# Patient Record
Sex: FEMALE | Race: BLACK OR AFRICAN AMERICAN | NOT HISPANIC OR LATINO | Employment: PART TIME | RURAL
[De-identification: names, ages, dates, MRNs, and addresses within clinical notes are randomized per-mention and may not be internally consistent; named-entity substitution may affect disease eponyms.]

---

## 2024-03-11 ENCOUNTER — OFFICE VISIT (OUTPATIENT)
Dept: CARDIOLOGY | Facility: CLINIC | Age: 44
End: 2024-03-11
Payer: MEDICAID

## 2024-03-11 VITALS
HEIGHT: 63 IN | SYSTOLIC BLOOD PRESSURE: 142 MMHG | HEART RATE: 83 BPM | BODY MASS INDEX: 42.81 KG/M2 | WEIGHT: 241.6 LBS | DIASTOLIC BLOOD PRESSURE: 80 MMHG

## 2024-03-11 DIAGNOSIS — R06.02 SHORTNESS OF BREATH: ICD-10-CM

## 2024-03-11 DIAGNOSIS — I50.32 DIASTOLIC CHF, CHRONIC: ICD-10-CM

## 2024-03-11 DIAGNOSIS — I10 HYPERTENSION, ESSENTIAL: ICD-10-CM

## 2024-03-11 DIAGNOSIS — R07.9 CHEST PAIN, UNSPECIFIED TYPE: Primary | ICD-10-CM

## 2024-03-11 PROCEDURE — 99204 OFFICE O/P NEW MOD 45 MIN: CPT | Performed by: SPECIALIST

## 2024-03-11 RX ORDER — PANTOPRAZOLE SODIUM 40 MG/1
40 TABLET, DELAYED RELEASE ORAL DAILY
COMMUNITY
Start: 2024-02-29

## 2024-03-11 RX ORDER — VALACYCLOVIR HYDROCHLORIDE 1 G/1
1000 TABLET, FILM COATED ORAL DAILY
COMMUNITY
Start: 2024-02-29 | End: 2024-03-11

## 2024-03-11 RX ORDER — BUSPIRONE HYDROCHLORIDE 10 MG/1
10 TABLET ORAL 3 TIMES DAILY
COMMUNITY
Start: 2024-02-29

## 2024-03-11 RX ORDER — LANOLIN ALCOHOL/MO/W.PET/CERES
400 CREAM (GRAM) TOPICAL DAILY
COMMUNITY
Start: 2024-02-29 | End: 2024-03-11

## 2024-03-11 RX ORDER — METOCLOPRAMIDE 10 MG/1
10 TABLET ORAL 4 TIMES DAILY
COMMUNITY
Start: 2024-02-10 | End: 2024-03-11

## 2024-03-11 RX ORDER — OMEPRAZOLE 40 MG/1
1 CAPSULE, DELAYED RELEASE ORAL DAILY
COMMUNITY
Start: 2023-11-29 | End: 2024-03-11

## 2024-03-11 RX ORDER — HYDROXYZINE HYDROCHLORIDE 25 MG/1
25 TABLET, FILM COATED ORAL EVERY 4 HOURS PRN
COMMUNITY
Start: 2024-02-01 | End: 2024-03-11

## 2024-03-11 RX ORDER — AMOXICILLIN 875 MG/1
875 TABLET, COATED ORAL 2 TIMES DAILY
COMMUNITY
Start: 2023-12-17

## 2024-03-11 RX ORDER — AMLODIPINE BESYLATE 5 MG/1
5 TABLET ORAL
COMMUNITY
Start: 2024-02-29 | End: 2024-03-11

## 2024-03-11 RX ORDER — PSEUDOEPHEDRINE HCL 30 MG
100 TABLET ORAL DAILY
COMMUNITY
Start: 2024-02-29

## 2024-03-11 RX ORDER — NITROGLYCERIN 0.4 MG/1
0.4 TABLET SUBLINGUAL
COMMUNITY
Start: 2023-12-20

## 2024-03-11 RX ORDER — FUROSEMIDE 20 MG/1
20 TABLET ORAL DAILY
COMMUNITY
Start: 2024-03-07

## 2024-03-11 RX ORDER — DICLOFENAC SODIUM 75 MG/1
75 TABLET, DELAYED RELEASE ORAL 2 TIMES DAILY
COMMUNITY
Start: 2024-02-10 | End: 2024-03-11

## 2024-03-11 RX ORDER — CARVEDILOL 6.25 MG/1
6.25 TABLET ORAL 2 TIMES DAILY WITH MEALS
COMMUNITY

## 2024-03-11 RX ORDER — SUCRALFATE 1 G/1
1 TABLET ORAL 4 TIMES DAILY
COMMUNITY
Start: 2024-03-06 | End: 2024-03-11

## 2024-03-11 RX ORDER — ASPIRIN 81 MG/1
81 TABLET ORAL DAILY
COMMUNITY

## 2024-03-11 RX ORDER — NYSTATIN 100000 [USP'U]/G
100000 POWDER TOPICAL 2 TIMES DAILY
COMMUNITY
Start: 2024-02-29

## 2024-03-11 RX ORDER — POLYETHYLENE GLYCOL 3350 17 G/17G
0.4 POWDER, FOR SOLUTION ORAL DAILY
COMMUNITY
Start: 2024-02-29

## 2024-03-11 RX ORDER — BUPRENORPHINE AND NALOXONE 8; 2 MG/1; MG/1
1 FILM, SOLUBLE BUCCAL; SUBLINGUAL DAILY
COMMUNITY
End: 2024-03-11

## 2024-03-11 RX ORDER — TRAMADOL HYDROCHLORIDE 50 MG/1
50 TABLET ORAL EVERY 12 HOURS PRN
COMMUNITY

## 2024-03-11 RX ORDER — BUDESONIDE AND FORMOTEROL FUMARATE DIHYDRATE 160; 4.5 UG/1; UG/1
2 AEROSOL RESPIRATORY (INHALATION)
COMMUNITY
Start: 2024-03-08 | End: 2024-03-11

## 2024-03-11 RX ORDER — HYDROCODONE BITARTRATE AND ACETAMINOPHEN 5; 325 MG/1; MG/1
1 TABLET ORAL EVERY 6 HOURS PRN
COMMUNITY
Start: 2024-02-16 | End: 2024-03-11

## 2024-03-11 RX ORDER — ALBUTEROL SULFATE 90 UG/1
2 AEROSOL, METERED RESPIRATORY (INHALATION) EVERY 4 HOURS PRN
COMMUNITY
Start: 2024-02-29

## 2024-03-11 NOTE — PROGRESS NOTES
[facility]   Cardiology Consult Note    Patient Name: Waleska Truong  : 1980  Referring Physician: Self Referring  Subjective   Subjective     Reason for Consult/ Chief Complaint:   Chief Complaint   Patient presents with    Chest Pain    Shortness of Breath    Leg Swelling       HPI:  Waleska Truong is a 43 y.o. female with history of shortness of breath on and off for 6 months or so shortness of breath is on exertion.  She also has some nonspecific nonexertional chest pain which lasted a few seconds to few minutes no exertional angina no PND no orthopnea no syncopal or presyncopal episode    Review of Systems:    Constitutional no fever,  no weight loss   Skin no rash   Otolaryngeal no difficulty swallowing   Cardiovascular See HPI   Pulmonary no cough, no sputum production   Gastrointestinal no constipation, no diarrhea   Genitourinary no dysuria, no hematuria   Hematologic no easy bruisability, no abnormal bleeding   Musculoskeletal no muscle pain   Neurologic no dizziness, no falls       Personal History     Past Medical History:  Past Medical History:   Diagnosis Date    Asthma     Hypertension        Family History: History reviewed. No pertinent family history.    Social History:  reports that she has never smoked. She has never been exposed to tobacco smoke. She has never used smokeless tobacco. She reports that she does not drink alcohol and does not use drugs.    Home Medications:  albuterol sulfate HFA, amoxicillin, aspirin, busPIRone, carvedilol, docusate sodium, furosemide, nitroglycerin, nystatin, pantoprazole, polyethylene glycol, and traMADol    Allergies:  No Known Allergies    Objective    Objective     Vitals:   Heart Rate:  [83] 83  BP: (142)/(80) 142/80  Body mass index is 42.8 kg/m².  PHYSICAL EXAM:    General Appearance:   well developed  well nourished  HENT:   oropharynx moist  lips not cyanotic  Neck:  thyroid not enlarged  supple  Respiratory:  no respiratory  distress  normal breath sounds  no rales  Cardiovascular:  no jugular venous distention  regular rhythm  apical impulse normal  S1 normal, S2 normal  no S3, no S4   no murmur  no rub, no thrill  carotid pulses normal; no bruit  pedal pulses normal  lower extremity edema: none    Skin:   warm, dry  Psychiatric:  judgement and insight appropriate  normal mood and affect    RESULTS:           Result Review    Result Review:  I have personally reviewed the available results:  [x]  Laboratory  [x]  EKG/Telemetry   [x]  Cardiology/Vascular   [x] Medications  [x]  Old records      Procedures     Impression/Plan  1.  Precordial atypical chest pain: Stress echocardiogram to evaluate for ischemia.  2.  Shortness of breath: Echocardiogram.  Left ventricular systolic function.  2.  Essential hypertension controlled: Continue carvedilol 6.25 mg twice a day.  4.  Chronic diastolic heart failure: Continue Lasix 20 mg once a day.  Echocardiogram.  Low-salt diet advised.        Electronically signed by Denis Burton MD, 03/11/24, 12:40 PM EDT.

## 2024-03-20 ENCOUNTER — TELEPHONE (OUTPATIENT)
Dept: CARDIOLOGY | Facility: CLINIC | Age: 44
End: 2024-03-20
Payer: MEDICAID

## 2024-03-20 ENCOUNTER — TELEPHONE (OUTPATIENT)
Dept: CARDIOLOGY | Facility: CLINIC | Age: 44
End: 2024-03-20

## 2024-03-20 NOTE — TELEPHONE ENCOUNTER
The [facility] received a fax that requires your attention. The document has been indexed to the patient’s chart for your review.      Reason for sending: EXTERNAL MEDICAL RECORD NOTIFICATION     Documents Description: PHYS ORD-[facility] TRANSPORT FORM-3.20.24    Name of Sender: [facility] [employer]     Date Indexed: 3.20.24

## 2024-03-20 NOTE — TELEPHONE ENCOUNTER
PARKER patient. Patient states that she having worsening symptoms. Patient c/o SOA and chest tightness. Patient states also have R arm numbness. Advised to go to ER

## 2024-04-11 NOTE — PROGRESS NOTES
[facility]  Cardiology progress Note    Patient Name: Waleska Truong  : 1980    CHIEF COMPLAINT  Hypertension        Subjective   Subjective     HISTORY OF PRESENT ILLNESS    Waleska Truong is a 43 y.o. female with history of hypertension and CHF.  No chest pain or shortness of breath.    REVIEW OF SYSTEMS    Constitutional:    No fever, no weight loss  Skin:     No rash  Otolaryngeal:    No difficulty swallowing  Cardiovascular: See HPI.  Pulmonary:    No cough, no sputum production    Personal History     Social History:    reports that she has never smoked. She has never been exposed to tobacco smoke. She has never used smokeless tobacco. She reports that she does not drink alcohol and does not use drugs.    Home Medications:  Current Outpatient Medications on File Prior to Visit   Medication Sig    aspirin 81 MG EC tablet Take 1 tablet by mouth Daily.    budesonide-formoterol (SYMBICORT) 160-4.5 MCG/ACT inhaler Inhale.    busPIRone (BUSPAR) 10 MG tablet Take 1 tablet by mouth 3 (Three) Times a Day.    cetirizine (zyrTEC) 10 MG tablet Take  by mouth.    docusate sodium 100 MG capsule Take 1 capsule by mouth Daily.    famotidine (PEPCID) 20 MG tablet Take  by mouth.    ferrous sulfate 324 (65 Fe) MG tablet delayed-release EC tablet     fluticasone (FLONASE) 50 MCG/ACT nasal spray into the nostril(s) as directed by provider.    furosemide (LASIX) 20 MG tablet Take 1 tablet by mouth Daily.    montelukast (SINGULAIR) 10 MG tablet Take  by mouth.    nitroglycerin (NITROSTAT) 0.4 MG SL tablet Place 1 tablet under the tongue Every 5 (Five) Minutes As Needed.    nystatin (MYCOSTATIN) 730129 UNIT/GM powder Apply 100,000 Applications topically to the appropriate area as directed 2 (Two) Times a Day.    pantoprazole (PROTONIX) 40 MG EC tablet Take 1 tablet by mouth Daily.    polyethylene glycol (MIRALAX) 17 GM/SCOOP powder Take 0.4 g/kg by mouth Daily.    sucralfate (CARAFATE) 1 g tablet Take  by  mouth.    Ventolin  (90 Base) MCG/ACT inhaler Inhale 2 puffs Every 4 (Four) Hours As Needed.    [DISCONTINUED] carvedilol (COREG) 6.25 MG tablet Take 1 tablet by mouth 2 (Two) Times a Day With Meals.    [DISCONTINUED] amoxicillin (AMOXIL) 875 MG tablet Take 1 tablet by mouth 2 (Two) Times a Day.    [DISCONTINUED] traMADol (ULTRAM) 50 MG tablet Take 1 tablet by mouth Every 12 (Twelve) Hours As Needed.     No current facility-administered medications on file prior to visit.       Past Medical History:   Diagnosis Date    Asthma     Hypertension        Allergies:  No Known Allergies    Objective    Objective       Vitals:   Heart Rate:  [80] 80  BP: (124)/(77) 124/77  Body mass index is 41.98 kg/m².     PHYSICAL EXAM:    General Appearance:   well developed  well nourished  HENT:   oropharynx moist  lips not cyanotic  Neck:  thyroid not enlarged  supple  Respiratory:  no respiratory distress  normal breath sounds  no rales  Cardiovascular:  no jugular venous distention  regular rhythm  apical impulse normal  S1 normal, S2 normal  no S3, no S4   no murmur  no rub, no thrill  carotid pulses normal; no bruit  pedal pulses normal  lower extremity edema: none    Skin:   warm, dry  Psychiatric:  judgement and insight appropriate  normal mood and affect        Result Review:  I have personally reviewed the available results from  [x]  Laboratory  [x]  EKG  [x]  Cardiology  [x]  Medications  [x]  Old records  []  Other:     Procedures    Results for orders placed during the hospital encounter of 04/17/24    Adult Stress Echo W/ Cont or Stress Agent if Necessary Per Protocol    Interpretation Summary    Normal stress echo consistent with a low risk study for myocardial ischemia.    Left ventricular wall thickness is consistent with mild concentric hypertrophy.    Rest echo shows normal left ventricular systolic function with no significant valve abnormalities.     Impression/Plan:  1.  Chronic diastolic heart failure  stable: Continue Lasix 20 mg once a day.  Low-salt are advised.  2.  Essential hypertension controlled: Increase carvedilol to 12.5 mg twice a day.  Monitor blood pressure regularly.  4.Palpitations: Increase carvedilol to 12.5 mg twice a day.  4.  Precordial atypical chest pain: Negative recent stress echo.        Denis Burton MD   04/22/24   10:36 EDT

## 2024-04-17 ENCOUNTER — HOSPITAL ENCOUNTER (OUTPATIENT)
Dept: CARDIOLOGY | Facility: HOSPITAL | Age: 44
Discharge: HOME | End: 2024-04-17
Admitting: SPECIALIST
Payer: MEDICAID

## 2024-04-17 VITALS — WEIGHT: 230 LBS | BODY MASS INDEX: 40.75 KG/M2 | HEIGHT: 63 IN

## 2024-04-17 DIAGNOSIS — R06.02 SHORTNESS OF BREATH: ICD-10-CM

## 2024-04-17 DIAGNOSIS — R07.9 CHEST PAIN, UNSPECIFIED TYPE: ICD-10-CM

## 2024-04-17 LAB
BH CV ECHO MEAS - AO ROOT DIAM: 2.46 CM
BH CV ECHO MEAS - EDV(CUBED): 44.2 ML
BH CV ECHO MEAS - EDV(MOD-SP2): 53.6 ML
BH CV ECHO MEAS - EDV(MOD-SP4): 66.4 ML
BH CV ECHO MEAS - EF(MOD-BP): 75.1 %
BH CV ECHO MEAS - EF(MOD-SP2): 78.9 %
BH CV ECHO MEAS - EF(MOD-SP4): 71.5 %
BH CV ECHO MEAS - ESV(CUBED): 14.6 ML
BH CV ECHO MEAS - ESV(MOD-SP2): 11.3 ML
BH CV ECHO MEAS - ESV(MOD-SP4): 18.9 ML
BH CV ECHO MEAS - FS: 30.8 %
BH CV ECHO MEAS - IVS/LVPW: 0.99 CM
BH CV ECHO MEAS - IVSD: 1.2 CM
BH CV ECHO MEAS - LA DIMENSION: 2.9 CM
BH CV ECHO MEAS - LAT PEAK E' VEL: 8.2 CM/SEC
BH CV ECHO MEAS - LV MASS(C)D: 138 GRAMS
BH CV ECHO MEAS - LVIDD: 3.5 CM
BH CV ECHO MEAS - LVIDS: 2.44 CM
BH CV ECHO MEAS - LVPWD: 1.2 CM
BH CV ECHO MEAS - MED PEAK E' VEL: 7.2 CM/SEC
BH CV ECHO MEAS - MV A MAX VEL: 75 CM/SEC
BH CV ECHO MEAS - MV DEC SLOPE: 469.1 CM/SEC2
BH CV ECHO MEAS - MV DEC TIME: 0.12 SEC
BH CV ECHO MEAS - MV E MAX VEL: 57 CM/SEC
BH CV ECHO MEAS - MV E/A: 0.76
BH CV ECHO MEAS - RVDD: 2.5 CM
BH CV ECHO MEAS - SV(MOD-SP2): 42.3 ML
BH CV ECHO MEAS - SV(MOD-SP4): 47.5 ML
BH CV ECHO MEASUREMENTS AVERAGE E/E' RATIO: 7.4
BH CV IMMEDIATE POST RECOVERY TECH DATA SYMPTOMS: NORMAL
BH CV IMMEDIATE POST TECH DATA HEART RATE: 119 BPM
BH CV SIX MINUTE RECOVERY TECH DATA BLOOD PRESSURE: NORMAL
BH CV SIX MINUTE RECOVERY TECH DATA HEART RATE: 95 BPM
BH CV SIX MINUTE RECOVERY TECH DATA SYMPTOMS: NORMAL
BH CV STRESS BP STAGE 1: NORMAL
BH CV STRESS BP STAGE 2: NORMAL
BH CV STRESS DURATION MIN STAGE 1: 3
BH CV STRESS DURATION MIN STAGE 2: 3
BH CV STRESS DURATION SEC STAGE 1: 0
BH CV STRESS DURATION SEC STAGE 2: 0
BH CV STRESS ECHO POST STRESS EJECTION FRACTION EF: 70 %
BH CV STRESS GRADE STAGE 1: 10
BH CV STRESS GRADE STAGE 2: 12
BH CV STRESS HR STAGE 1: 135
BH CV STRESS HR STAGE 2: 148
BH CV STRESS METS STAGE 1: 5
BH CV STRESS METS STAGE 2: 7.5
BH CV STRESS O2 STAGE 1: 86
BH CV STRESS O2 STAGE 2: 96
BH CV STRESS PROTOCOL 1: NORMAL
BH CV STRESS RECOVERY BP: NORMAL MMHG
BH CV STRESS RECOVERY HR: 95 BPM
BH CV STRESS SPEED STAGE 1: 1.7
BH CV STRESS SPEED STAGE 2: 2.5
BH CV STRESS STAGE 1: 1
BH CV STRESS STAGE 2: 2
BH CV THREE MINUTE POST TECH DATA BLOOD PRESSURE: NORMAL MMHG
BH CV THREE MINUTE POST TECH DATA HEART RATE: 100 BPM
LEFT ATRIUM VOLUME INDEX: 27.8 ML/M2
MAXIMAL PREDICTED HEART RATE: 178 BPM
PERCENT MAX PREDICTED HR: 83.15 %
STRESS BASELINE BP: NORMAL MMHG
STRESS BASELINE HR: 89 BPM
STRESS PERCENT HR: 98 %
STRESS POST ESTIMATED WORKLOAD: 6 METS
STRESS POST EXERCISE DUR MIN: 5 MIN
STRESS POST EXERCISE DUR SEC: 59 SEC
STRESS POST O2 SAT PEAK: 96 %
STRESS POST PEAK BP: NORMAL MMHG
STRESS POST PEAK HR: 148 BPM
STRESS TARGET HR: 151 BPM

## 2024-04-17 PROCEDURE — 93325 DOPPLER ECHO COLOR FLOW MAPG: CPT

## 2024-04-17 PROCEDURE — 93320 DOPPLER ECHO COMPLETE: CPT

## 2024-04-17 PROCEDURE — 93017 CV STRESS TEST TRACING ONLY: CPT

## 2024-04-17 PROCEDURE — 93350 STRESS TTE ONLY: CPT

## 2024-04-19 ENCOUNTER — TELEPHONE (OUTPATIENT)
Dept: CARDIOLOGY | Facility: CLINIC | Age: 44
End: 2024-04-19
Payer: MEDICAID

## 2024-04-19 NOTE — TELEPHONE ENCOUNTER
Caller: Wilian Truong    Relationship: Self    Best call back number: [phone]     Caller requesting test results: WILIAN    What test was performed: STRESS ECHO    When was the test performed: 4.17.24    Where was the test performed: [facility]    Additional notes:

## 2024-04-19 NOTE — TELEPHONE ENCOUNTER
Notify pt stress test is negative for acute ischemia. Keep follow up as scheduled. Notify office if symptoms persist/worsen.    Pt advised

## 2024-04-19 NOTE — TELEPHONE ENCOUNTER
----- Message from Raisa Pulido sent at 4/19/2024 10:39 AM EDT -----    ----- Message -----  From: Denis Burton MD  Sent: 4/17/2024   4:26 PM EDT  To: Bhumi Perdue    Notify pt stress test is negative for acute ischemia. Keep follow up as scheduled. Notify office if symptoms persist/worsen.

## 2024-04-22 ENCOUNTER — OFFICE VISIT (OUTPATIENT)
Dept: CARDIOLOGY | Facility: CLINIC | Age: 44
End: 2024-04-22
Payer: MEDICAID

## 2024-04-22 VITALS
HEART RATE: 80 BPM | BODY MASS INDEX: 41.99 KG/M2 | SYSTOLIC BLOOD PRESSURE: 124 MMHG | WEIGHT: 237 LBS | DIASTOLIC BLOOD PRESSURE: 77 MMHG | HEIGHT: 63 IN

## 2024-04-22 DIAGNOSIS — I10 HYPERTENSION, ESSENTIAL: Primary | ICD-10-CM

## 2024-04-22 DIAGNOSIS — I50.32 DIASTOLIC CHF, CHRONIC: ICD-10-CM

## 2024-04-22 DIAGNOSIS — R00.2 PALPITATIONS: ICD-10-CM

## 2024-04-22 DIAGNOSIS — R07.9 CHEST PAIN, UNSPECIFIED TYPE: ICD-10-CM

## 2024-04-22 PROCEDURE — 1159F MED LIST DOCD IN RCRD: CPT | Performed by: SPECIALIST

## 2024-04-22 PROCEDURE — 1160F RVW MEDS BY RX/DR IN RCRD: CPT | Performed by: SPECIALIST

## 2024-04-22 PROCEDURE — 99214 OFFICE O/P EST MOD 30 MIN: CPT | Performed by: SPECIALIST

## 2024-04-22 RX ORDER — FAMOTIDINE 20 MG/1
TABLET, FILM COATED ORAL
COMMUNITY
Start: 2024-04-15

## 2024-04-22 RX ORDER — FERROUS SULFATE 324(65)MG
TABLET, DELAYED RELEASE (ENTERIC COATED) ORAL
COMMUNITY
Start: 2024-03-26

## 2024-04-22 RX ORDER — SUCRALFATE 1 G/1
TABLET ORAL
COMMUNITY
Start: 2024-03-06

## 2024-04-22 RX ORDER — CARVEDILOL 12.5 MG/1
12.5 TABLET ORAL 2 TIMES DAILY WITH MEALS
Qty: 180 TABLET | Refills: 5 | Status: SHIPPED | OUTPATIENT
Start: 2024-04-22 | End: 2024-04-29 | Stop reason: SDUPTHER

## 2024-04-22 RX ORDER — BUDESONIDE AND FORMOTEROL FUMARATE DIHYDRATE 160; 4.5 UG/1; UG/1
AEROSOL RESPIRATORY (INHALATION)
COMMUNITY
Start: 2024-03-28

## 2024-04-22 RX ORDER — MONTELUKAST SODIUM 10 MG/1
TABLET ORAL
COMMUNITY
Start: 2024-03-28

## 2024-04-22 RX ORDER — CETIRIZINE HYDROCHLORIDE 10 MG/1
TABLET ORAL
COMMUNITY
Start: 2024-03-28

## 2024-04-22 RX ORDER — FLUTICASONE PROPIONATE 50 MCG
SPRAY, SUSPENSION (ML) NASAL
COMMUNITY
Start: 2024-03-28

## 2024-04-29 ENCOUNTER — TELEPHONE (OUTPATIENT)
Dept: CARDIOLOGY | Facility: CLINIC | Age: 44
End: 2024-04-29
Payer: MEDICAID

## 2024-04-29 RX ORDER — CARVEDILOL 3.12 MG/1
3.12 TABLET ORAL 2 TIMES DAILY WITH MEALS
Qty: 180 TABLET | Refills: 3 | Status: SHIPPED | OUTPATIENT
Start: 2024-04-29

## 2024-04-29 NOTE — TELEPHONE ENCOUNTER
[facility].  WAS ADVISED PT PICKED UP SCRIPT IN JAN FROM PCP THAT WAS CARVEDILOL 3.125 MG.  PARKER PT.  PT STATED SHE WAS NOT AWARE MEDICATION WAS INCREASED AT LOV TO 12.5 MG BID AND WANTS TO KNOW WHY.

## 2024-04-29 NOTE — TELEPHONE ENCOUNTER
Received Vm from patient regarding her Coreg. Patient states she had been taking 3.125 mg BID. Patient states Dr Burton increased it to 12.5. Patient states there was no mention in OV that he was going to increase the medication. Patient denies any symptoms.

## 2024-04-29 NOTE — TELEPHONE ENCOUNTER
[facility]. WAS ADVISED PT PICKED UP SCRIPT JAN 2024 FROM PCP THAT WAS CARVEDILOL 3.125 MG BID.

## 2024-04-29 NOTE — TELEPHONE ENCOUNTER
Caller: Waleska Truong    Relationship: Self    Best call back number: [phone]     Which medication are you concerned about: CARVEDILOL    Who prescribed you this medication: TRUPTI ALBERTO; DR. CHIU ADJUSTED    When did you start taking this medication: 6 MONTHS    What are your concerns: PT STATES THAT HER CARVEDILOL DOSE WAS TRIPLED. HER PHARMACY REFUSED TO GIVE IT TO HER BECAUSE IT WAS SO HIGH. THEY NEED HER TO CONFIRM WITH DR. CHIU THAT HE DID WANT TO INCREASE THE DOSAGE AND WHY.    How long have you had these concerns: TODAY

## 2024-06-21 ENCOUNTER — TELEPHONE (OUTPATIENT)
Dept: CARDIOLOGY | Facility: CLINIC | Age: 44
End: 2024-06-21
Payer: MEDICAID

## 2024-06-21 RX ORDER — FUROSEMIDE 20 MG/1
20 TABLET ORAL DAILY
Qty: 90 TABLET | Refills: 3 | Status: SHIPPED | OUTPATIENT
Start: 2024-06-21

## 2024-06-21 NOTE — TELEPHONE ENCOUNTER
SW patient. Went over recommendation to go to ER for evaluation, patient verbalized understanding and appreciation for the refill.

## 2024-06-21 NOTE — TELEPHONE ENCOUNTER
Caller: Waleska Truong    Relationship to patient: Self    Best call back number: [phone]     Chief complaint: PALPITATIONS. RAPID HEART BEAT, SHORTNESS OF BREATH, WEAKNESS, FATIGUE, SWELLING OF BOTH FEET    Type of visit: FOLLOW UP    Requested date: ASAP     Additional notes:PATIENT PREFERS TO BE SEEN IN [place]. SHE PREFERS MORNING APPOINTMENTS IF POSSIBLE. SHE CAN COME IN ALL DAY ON THURSDAYS.

## 2024-06-21 NOTE — TELEPHONE ENCOUNTER
PARKER patient. Patient states she was still taking the amlodipine and carvedilol up to two weeks ago. Patient stopped her amlodipine at that time.   Patient states she has been feeling this way for couple of days. Patient is experiencing chest discomfort, with pain in her left shoulder- she states she takes nitroglycerine when it gets really bad and it helps.     Patient states she is compliant with her carvedilol 3.125 BID  Patient ran out of her furosemide 2 weeks ago but her PCP has not refilled it after calling twice.     Patient has been instructed if she is experiencing chest pain, radiating to her left shoulder/back she is to go to nearest ER for evaluation. Patient verbalized understanding and stated she felt she can wait until she sees Dr Burton.     Please advise.

## 2024-06-24 ENCOUNTER — OFFICE VISIT (OUTPATIENT)
Dept: CARDIOLOGY | Facility: CLINIC | Age: 44
End: 2024-06-24
Payer: MEDICAID

## 2024-06-24 VITALS
HEART RATE: 72 BPM | HEIGHT: 63 IN | DIASTOLIC BLOOD PRESSURE: 94 MMHG | SYSTOLIC BLOOD PRESSURE: 128 MMHG | BODY MASS INDEX: 42.7 KG/M2 | WEIGHT: 241 LBS

## 2024-06-24 DIAGNOSIS — I50.32 DIASTOLIC CHF, CHRONIC: ICD-10-CM

## 2024-06-24 DIAGNOSIS — I10 HYPERTENSION, ESSENTIAL: ICD-10-CM

## 2024-06-24 DIAGNOSIS — R00.2 PALPITATIONS: ICD-10-CM

## 2024-06-24 DIAGNOSIS — R07.9 CHEST PAIN, UNSPECIFIED TYPE: Primary | ICD-10-CM

## 2024-06-24 PROCEDURE — 99214 OFFICE O/P EST MOD 30 MIN: CPT | Performed by: SPECIALIST

## 2024-06-24 RX ORDER — CARVEDILOL 6.25 MG/1
6.25 TABLET ORAL 2 TIMES DAILY WITH MEALS
Qty: 180 TABLET | Refills: 5 | Status: SHIPPED | OUTPATIENT
Start: 2024-06-24

## 2024-06-24 NOTE — PROGRESS NOTES
[facility]  Cardiology progress Note    Patient Name: Waleska Truong  : 1980    CHIEF COMPLAINT  Atypical chest pain/palpitations        Subjective   Subjective     HISTORY OF PRESENT ILLNESS    Waleska Truong is a 44 y.o. female with history of atypical chest pain palpitations still has some palpitations off and on.    REVIEW OF SYSTEMS    Constitutional:    No fever, no weight loss  Skin:     No rash  Otolaryngeal:    No difficulty swallowing  Cardiovascular: See HPI.  Pulmonary:    No cough, no sputum production    Personal History     Social History:    reports that she has never smoked. She has never been exposed to tobacco smoke. She has never used smokeless tobacco. She reports that she does not drink alcohol and does not use drugs.    Home Medications:  Current Outpatient Medications on File Prior to Visit   Medication Sig    aspirin 81 MG EC tablet Take 1 tablet by mouth Daily.    budesonide-formoterol (SYMBICORT) 160-4.5 MCG/ACT inhaler Inhale.    busPIRone (BUSPAR) 10 MG tablet Take 1 tablet by mouth 3 (Three) Times a Day.    carvedilol (COREG) 3.125 MG tablet Take 1 tablet by mouth 2 (Two) Times a Day With Meals.    cetirizine (zyrTEC) 10 MG tablet Take  by mouth.    docusate sodium 100 MG capsule Take 1 capsule by mouth Daily.    famotidine (PEPCID) 20 MG tablet Take  by mouth.    ferrous sulfate 324 (65 Fe) MG tablet delayed-release EC tablet     fluticasone (FLONASE) 50 MCG/ACT nasal spray into the nostril(s) as directed by provider.    furosemide (LASIX) 20 MG tablet Take 1 tablet by mouth Daily.    montelukast (SINGULAIR) 10 MG tablet Take  by mouth.    nitroglycerin (NITROSTAT) 0.4 MG SL tablet Place 1 tablet under the tongue Every 5 (Five) Minutes As Needed.    nystatin (MYCOSTATIN) 649306 UNIT/GM powder Apply 100,000 Applications topically to the appropriate area as directed 2 (Two) Times a Day.    pantoprazole (PROTONIX) 40 MG EC tablet Take 1 tablet by mouth Daily.     polyethylene glycol (MIRALAX) 17 GM/SCOOP powder Take 0.4 g/kg by mouth Daily.    sucralfate (CARAFATE) 1 g tablet Take  by mouth.    Ventolin  (90 Base) MCG/ACT inhaler Inhale 2 puffs Every 4 (Four) Hours As Needed.     No current facility-administered medications on file prior to visit.       Past Medical History:   Diagnosis Date    Asthma     Hypertension        Allergies:  No Known Allergies    Objective    Objective       Vitals:   Heart Rate:  [72] 72  BP: (128)/(94) 128/94  Body mass index is 42.69 kg/m².     PHYSICAL EXAM:    General Appearance:   well developed  well nourished  HENT:   oropharynx moist  lips not cyanotic  Neck:  thyroid not enlarged  supple  Respiratory:  no respiratory distress  normal breath sounds  no rales  Cardiovascular:  no jugular venous distention  regular rhythm  apical impulse normal  S1 normal, S2 normal  no S3, no S4   no murmur  no rub, no thrill  carotid pulses normal; no bruit  pedal pulses normal  lower extremity edema: none    Skin:   warm, dry  Psychiatric:  judgement and insight appropriate  normal mood and affect        Result Review:  I have personally reviewed the available results from  [x]  Laboratory  [x]  EKG  [x]  Cardiology  [x]  Medications  [x]  Old records  []  Other:     Procedures    Results for orders placed during the hospital encounter of 04/17/24    Adult Stress Echo W/ Cont or Stress Agent if Necessary Per Protocol    Interpretation Summary    Normal stress echo consistent with a low risk study for myocardial ischemia.    Left ventricular wall thickness is consistent with mild concentric hypertrophy.    Rest echo shows normal left ventricular systolic function with no significant valve abnormalities.     Impression/Plan:  1.  Precordial atypical chest pain: Negative stress echo.  Discussed with the patient results of his stress echo.  2.  Palpitations: Increase carvedilol to 6.25 mg twice a day.  3.  Essential hypertension: Increase carvedilol  to 6.25 mg twice a day.  Adjust dose depending on blood pressure.  She could not tolerate amlodipine.  4.  Chronic diastolic heart failure/pedal edema: Continue Lasix 20 mg once a day.  Monitor BMP.           Denis Burton MD   06/24/24   12:06 EDT

## 2024-08-09 ENCOUNTER — TELEPHONE (OUTPATIENT)
Dept: CARDIOLOGY | Facility: CLINIC | Age: 44
End: 2024-08-09
Payer: MEDICAID

## 2024-08-09 NOTE — TELEPHONE ENCOUNTER
PT CALLED BACK, GAVE HER THE FAX NUMBER ENDING IN   .   TRIED TO CALL PT, PHONE# NOT ACCEPTING VMS.  WILL TRY AGAIN BEFORE EOD.

## 2024-08-09 NOTE — TELEPHONE ENCOUNTER
Caller: Farrukh Truonghondra    Relationship: Self    Best call back number: [phone]    What is the best time to reach you: ANY    Who are you requesting to speak with (clinical staff, provider,  specific staff member): ANY    What was the call regarding: PATIENT CALLING TO SEE IF A FAX FROM HER TRANSPORTATION WAS RECEIVED TO VERIFY HER APPT SO THEY CAN BRING HER.  IF NOT IS IT POSSIBLE FOR THEM TO EMAIL IT? PLEASE REACH OUT TO PATIENT TO LET HER KNOW.    HER APPT IS 8-13-24

## 2024-08-13 ENCOUNTER — OFFICE VISIT (OUTPATIENT)
Dept: CARDIOLOGY | Facility: CLINIC | Age: 44
End: 2024-08-13
Payer: MEDICAID

## 2024-08-13 ENCOUNTER — TELEPHONE (OUTPATIENT)
Dept: CARDIOLOGY | Facility: CLINIC | Age: 44
End: 2024-08-13

## 2024-08-13 VITALS
HEART RATE: 98 BPM | HEIGHT: 63 IN | WEIGHT: 238.4 LBS | SYSTOLIC BLOOD PRESSURE: 132 MMHG | BODY MASS INDEX: 42.24 KG/M2 | DIASTOLIC BLOOD PRESSURE: 98 MMHG

## 2024-08-13 DIAGNOSIS — E66.01 MORBID OBESITY WITH BMI OF 40.0-44.9, ADULT: ICD-10-CM

## 2024-08-13 DIAGNOSIS — F41.9 ANXIETY: ICD-10-CM

## 2024-08-13 DIAGNOSIS — R00.2 PALPITATIONS: Primary | ICD-10-CM

## 2024-08-13 DIAGNOSIS — R07.89 CHEST PAIN, ATYPICAL: ICD-10-CM

## 2024-08-13 DIAGNOSIS — I10 ESSENTIAL HYPERTENSION: ICD-10-CM

## 2024-08-13 PROCEDURE — 1159F MED LIST DOCD IN RCRD: CPT | Performed by: INTERNAL MEDICINE

## 2024-08-13 PROCEDURE — 99214 OFFICE O/P EST MOD 30 MIN: CPT | Performed by: INTERNAL MEDICINE

## 2024-08-13 PROCEDURE — 3080F DIAST BP >= 90 MM HG: CPT | Performed by: INTERNAL MEDICINE

## 2024-08-13 PROCEDURE — 3075F SYST BP GE 130 - 139MM HG: CPT | Performed by: INTERNAL MEDICINE

## 2024-08-13 PROCEDURE — 93000 ELECTROCARDIOGRAM COMPLETE: CPT | Performed by: INTERNAL MEDICINE

## 2024-08-13 PROCEDURE — 1160F RVW MEDS BY RX/DR IN RCRD: CPT | Performed by: INTERNAL MEDICINE

## 2024-08-13 RX ORDER — OMEPRAZOLE 20 MG/1
20 CAPSULE, DELAYED RELEASE ORAL DAILY
COMMUNITY

## 2024-08-13 RX ORDER — AMLODIPINE BESYLATE 2.5 MG/1
5 TABLET ORAL DAILY
COMMUNITY

## 2024-08-13 NOTE — PROGRESS NOTES
[facility]  INTERVENTIONAL CARDIOLOGY FOLLOW-UP PROGRESS NOTE        Chief Complaint  Chest Pain and Shortness of Breath    Subjective            History of Present Illness  Waleska Truong is a 44 y.o. female who presents to [facility] CARDIOLOGY.  Patient has a past medical history of hypertension, anxiety, asthma, high blood pressure acid reflux. Patient was seen by another cardiology provider but she wanted to switch.  Patient recently had a stress echo on 2024 which showed no significant wall motion abnormalities.  Baseline echo showed normal LVEF without wall motion abnormalities as well.  Patient's blood pressure today is 132/98.  Patient takes carvedilol 6.25 twice daily and amlodipine 2.5 daily.  Patient is worried that she might have heart disease given her family history.  Patient states she gets occasional and intermittent chest pain that feels like pressure and sometimes radiates to the left side and sometimes to the right side of the heart.  1 time she felt her heart rate is slow that was associated with fatigue.  She denies syncope/presyncope, leg edema.      Past History:  Past Medical History:   Diagnosis Date    Asthma     Hypertension        Medical History:  Past Medical History:   Diagnosis Date    Asthma     Hypertension        Surgical History:   has a past surgical history that includes Cholecystectomy (N/A);  section (N/A, 2008); and  section (N/A, 2020).     Family History:   family history includes Heart failure in her sister; Hypertension in her father and mother.     Social History:   reports that she has never smoked. She has never been exposed to tobacco smoke. She has never used smokeless tobacco. She reports that she does not drink alcohol and does not use drugs.    Allergies:   Patient has no known allergies.    Current Outpatient Medications on File Prior to Visit   Medication Sig    amLODIPine (NORVASC) 2.5 MG  "tablet Take 2 tablets by mouth Daily.    aspirin 81 MG EC tablet Take 1 tablet by mouth Daily.    budesonide-formoterol (SYMBICORT) 160-4.5 MCG/ACT inhaler Inhale.    carvedilol (COREG) 6.25 MG tablet Take 1 tablet by mouth 2 (Two) Times a Day With Meals.    cetirizine (zyrTEC) 10 MG tablet Take  by mouth.    docusate sodium 100 MG capsule Take 1 capsule by mouth Daily.    ferrous sulfate 324 (65 Fe) MG tablet delayed-release EC tablet     montelukast (SINGULAIR) 10 MG tablet Take  by mouth.    nitroglycerin (NITROSTAT) 0.4 MG SL tablet Place 1 tablet under the tongue Every 5 (Five) Minutes As Needed.    nystatin (MYCOSTATIN) 971923 UNIT/GM powder Apply 100,000 Applications topically to the appropriate area as directed 2 (Two) Times a Day.    omeprazole (priLOSEC) 20 MG capsule Take 1 capsule by mouth Daily.    Ventolin  (90 Base) MCG/ACT inhaler Inhale 2 puffs Every 4 (Four) Hours As Needed.    [DISCONTINUED] busPIRone (BUSPAR) 10 MG tablet Take 1 tablet by mouth 3 (Three) Times a Day.    famotidine (PEPCID) 20 MG tablet Take  by mouth. (Patient not taking: Reported on 8/13/2024)    fluticasone (FLONASE) 50 MCG/ACT nasal spray into the nostril(s) as directed by provider. (Patient not taking: Reported on 8/13/2024)    pantoprazole (PROTONIX) 40 MG EC tablet Take 1 tablet by mouth Daily. (Patient not taking: Reported on 8/13/2024)    polyethylene glycol (MIRALAX) 17 GM/SCOOP powder Take 0.4 g/kg by mouth Daily. (Patient not taking: Reported on 8/13/2024)    sucralfate (CARAFATE) 1 g tablet Take  by mouth. (Patient not taking: Reported on 8/13/2024)    [DISCONTINUED] furosemide (LASIX) 20 MG tablet Take 1 tablet by mouth Daily. (Patient not taking: Reported on 8/13/2024)     No current facility-administered medications on file prior to visit.          Review of Systems   Negative except as mentioned in HPI above.    Objective     /98   Pulse 98   Ht 160 cm (62.99\")   Wt 108 kg (238 lb 6.4 oz)   BMI " 42.24 kg/m²       Physical Exam    General : Alert, awake, no acute distress  Neck : Supple, no carotid bruit, no jugular venous distention  CVS : Regular rate and rhythm, no murmur, rubs or gallops  Lungs: Clear to auscultation bilaterally, no crackles or rhonchi  Abdomen: Soft, nontender, bowel sounds heard in all 4 quadrants  Extremities: Warm, well-perfused, no pedal edema    Result Review :     The following data was reviewed by: Martha Wright MD on 08/13/2024:                   Data reviewed: Cardiology studies    Results for orders placed during the hospital encounter of 04/17/24    Adult Stress Echo W/ Cont or Stress Agent if Necessary Per Protocol    Interpretation Summary    Normal stress echo consistent with a low risk study for myocardial ischemia.    Left ventricular wall thickness is consistent with mild concentric hypertrophy.    Rest echo shows normal left ventricular systolic function with no significant valve abnormalities.        ECG 12 Lead    Date/Time: 8/13/2024 12:31 PM  Performed by: Martha Wright MD    Authorized by: Martha Wright MD  Comparison: compared with previous ECG   Similar to previous ECG  Rhythm: sinus rhythm  Rate: normal  QRS axis: normal    Clinical impression: normal ECG        No results found for this or any previous visit.        ASCVD Score  The ASCVD Risk score (Batavia DK, et al., 2019) failed to calculate for the following reasons:    Cannot find a previous HDL lab    Cannot find a previous total cholesterol lab         Assessment and Plan          Diagnoses and all orders for this visit:    1. Palpitations (Primary)  -     Cardiac Event Monitor    2. Essential hypertension    3. Morbid obesity with BMI of 40.0-44.9, adult    4. Chest pain, atypical    5. Anxiety        Plan  Patient with anxiety disorder, asthma presents with intermittent chest pain.  Recent stress echo within normal findings.  Will obtain 30-day event monitor.  Will uptitrate amlodipine to 5  mg daily from 2.5 mg daily.  Continue Coreg 6.25 mg twice daily for hypertension.  Will follow-up in 4 months or sooner if needed      Follow Up     Return in about 4 months (around 12/13/2024) for With Maryam White.    Patient was given instructions and counseling regarding her condition or for health maintenance advice. Please see specific information pulled into the AVS if appropriate.   Patient was educated on heart healthy diet, daily exercise and achieving optimal weight.     Waleska Truong  reports that she has never smoked. She has never been exposed to tobacco smoke. She has never used smokeless tobacco.       Martha Wright MD  08/13/24  12:07 EDT    Dictated Utilizing Dragon Dictation

## 2024-08-13 NOTE — LETTER
August 13, 2024     ROXI Hung  [address]  [place] [address]    Patient: Waleska Truong   YOB: 1980   Date of Visit: 8/13/2024       Dear ROXI Hung,    Waleska Truong was in my office today. Below are the relevant portions of my assessment and plan of care.           If you have questions, please do not hesitate to call me. I look forward to following Waleska along with you.         Sincerely,        Martha Wright MD        CC: No Recipients

## 2024-08-13 NOTE — TELEPHONE ENCOUNTER
Caller: Waleska Truong    Relationship: Self    Best call back number:[phone]     What orders are you requesting (i.e. lab or imaging): LABS WERE DISCUSSED AT HER APPOINTMENT    In what timeframe would the patient need to come in: ASAP    Where will you receive your lab/imaging services: IN [place] [facility]    Additional notes: PATIENT NEEDS TO KNOW WHEN TO HAVE LABS DRAWN BEFORE HER NEXT VISIT.

## 2024-08-13 NOTE — TELEPHONE ENCOUNTER
Called and patient wanted to have labs at [facility] in [place], order was faxed over.

## 2024-10-01 ENCOUNTER — TELEPHONE (OUTPATIENT)
Dept: CARDIOLOGY | Facility: CLINIC | Age: 44
End: 2024-10-01

## 2024-10-01 NOTE — TELEPHONE ENCOUNTER
Caller: Waleska Truong    Relationship: Self    Best call back number: [phone]     Caller requesting test results: SELF    What test was performed: CARDIAC EVENT MONITOR    When was the test performed: 09.23.24

## 2024-10-02 NOTE — TELEPHONE ENCOUNTER
Caller: Wilian Truong    Relationship: Self    Best call back number: [phone]     What is the best time to reach you: ANY    Who are you requesting to speak with (clinical staff, provider,  specific staff member): CLINICAL    Do you know the name of the person who called: WILIAN    What was the call regarding: PT IS CHECKING IN ON THESE RESULTS. IS VERY ANXIOUS FOR RESULTS    Is it okay if the provider responds through [facility]: DOES NOT HAVE

## 2024-10-03 ENCOUNTER — TELEPHONE (OUTPATIENT)
Dept: CARDIOLOGY | Facility: CLINIC | Age: 44
End: 2024-10-03
Payer: MEDICAID

## 2024-10-03 NOTE — TELEPHONE ENCOUNTER
----- Message from Martha Wright sent at 10/2/2024  3:52 PM EDT -----  Low risk event monitor study. 158 patient triggered events did not correlate with any significant atrial or ventricular arrhythmia.

## 2024-10-11 ENCOUNTER — TELEPHONE (OUTPATIENT)
Dept: CARDIOLOGY | Facility: CLINIC | Age: 44
End: 2024-10-11
Payer: MEDICAID

## 2024-10-11 NOTE — TELEPHONE ENCOUNTER
Caller: Waleska Truong    Relationship to patient: Self    Best call back number: [phone]     Chief complaint: DIZZY SPELLS, CHEST PAIN, PALPITATIONS, WEIRD SENSATION IN CHEST, FATIGUE, HURTS WHEN SHE BREATHES    Patient directed to call 911 or go to their nearest emergency room.     Patient verbalized understanding: [] Yes  [x] No  If no, why? SHE ALSO DOES NOT HAVE TRANSPORTATION OR .

## 2024-10-11 NOTE — TELEPHONE ENCOUNTER
Attempted to call patient. Unable to leave VM as no VM has been set up. Dialed alternate number, No longer in service.     Attempted to call patient's father, verified on verbal consent.     Attempted to call patient several times, unsuccessful. Please advise

## 2024-10-11 NOTE — TELEPHONE ENCOUNTER
Patient called stating she has been chest pain with dizziness off and on. Patient admits she hasn't been taking her bp medications as prescribed as she has felt so bad.     Patient has taken her off her amlodipine as this has made her feel bad- SOA- and swelling in my feet. When she stopped taking the amlodipine she felt so much better.     Scheduled patient for sooner f/u with ROXI Beckford. Encouraged patient to purchase a BP device so she can properly monitor her BP/HR.     Patient provided with ER precautions if chest pain becomes severe. Patient verbalized understanding and appreciation.

## 2024-11-23 ENCOUNTER — TELEPHONE (OUTPATIENT)
Dept: CARDIOLOGY | Facility: CLINIC | Age: 44
End: 2024-11-23

## 2024-11-23 NOTE — TELEPHONE ENCOUNTER
The [facility] received a fax that requires your attention. The document has been indexed to the patient’s chart for your review.      Reason for sending: EXTERNAL MEDICAL RECORD NOTIFICATION     Documents Description: MEDICAID TRANSPORTATION     Name of Sender: [facility]A [facility] AT [facility] TRANSPORTATION    Date Indexed: 11.23.24

## 2024-11-25 ENCOUNTER — TELEPHONE (OUTPATIENT)
Dept: CARDIOLOGY | Facility: CLINIC | Age: 44
End: 2024-11-25
Payer: MEDICAID

## 2024-11-25 NOTE — TELEPHONE ENCOUNTER
The [facility] received a fax that requires your attention. The document has been indexed to the patient’s chart for your review.      Reason for sending: RECD FAX TO ATTENTION DR RANGEL     Documents Description: [facility] [employer] - MEDICAID TRANSPORTATION FORM TO BE COMPLETED FOR PATIENT    Name of Sender: [facility]     Date Indexed: 11/19/24

## 2024-12-10 ENCOUNTER — OFFICE VISIT (OUTPATIENT)
Dept: CARDIOLOGY | Facility: CLINIC | Age: 44
End: 2024-12-10
Payer: MEDICAID

## 2024-12-10 VITALS
BODY MASS INDEX: 43.77 KG/M2 | SYSTOLIC BLOOD PRESSURE: 126 MMHG | DIASTOLIC BLOOD PRESSURE: 80 MMHG | WEIGHT: 247 LBS | HEART RATE: 84 BPM | HEIGHT: 63 IN

## 2024-12-10 DIAGNOSIS — R07.2 PRECORDIAL CHEST PAIN: ICD-10-CM

## 2024-12-10 DIAGNOSIS — R06.02 SHORTNESS OF BREATH: ICD-10-CM

## 2024-12-10 DIAGNOSIS — R00.2 PALPITATIONS: ICD-10-CM

## 2024-12-10 DIAGNOSIS — I10 ESSENTIAL HYPERTENSION: Primary | ICD-10-CM

## 2024-12-10 PROCEDURE — 99214 OFFICE O/P EST MOD 30 MIN: CPT | Performed by: NURSE PRACTITIONER

## 2024-12-10 PROCEDURE — 3074F SYST BP LT 130 MM HG: CPT | Performed by: NURSE PRACTITIONER

## 2024-12-10 PROCEDURE — 3079F DIAST BP 80-89 MM HG: CPT | Performed by: NURSE PRACTITIONER

## 2024-12-10 PROCEDURE — 1159F MED LIST DOCD IN RCRD: CPT | Performed by: NURSE PRACTITIONER

## 2024-12-10 PROCEDURE — 1160F RVW MEDS BY RX/DR IN RCRD: CPT | Performed by: NURSE PRACTITIONER

## 2024-12-10 RX ORDER — RISPERIDONE 1 MG/1
1 TABLET ORAL DAILY
COMMUNITY
Start: 2024-11-29

## 2024-12-10 RX ORDER — BUSPIRONE HYDROCHLORIDE 10 MG/1
10 TABLET ORAL 3 TIMES DAILY
COMMUNITY
Start: 2024-07-24

## 2024-12-10 RX ORDER — NITROGLYCERIN 0.4 MG/1
0.8 TABLET SUBLINGUAL
OUTPATIENT
Start: 2024-12-10

## 2024-12-10 RX ORDER — NITROGLYCERIN 0.4 MG/1
0.4 TABLET SUBLINGUAL
OUTPATIENT
Start: 2024-12-10 | End: 2024-12-10

## 2024-12-10 RX ORDER — SODIUM CHLORIDE 0.9 % (FLUSH) 0.9 %
10 SYRINGE (ML) INJECTION EVERY 12 HOURS SCHEDULED
OUTPATIENT
Start: 2024-12-10

## 2024-12-10 RX ORDER — METOPROLOL TARTRATE 50 MG
50 TABLET ORAL
OUTPATIENT
Start: 2024-12-10

## 2024-12-10 RX ORDER — METOPROLOL TARTRATE 25 MG/1
200 TABLET, FILM COATED ORAL ONCE
OUTPATIENT
Start: 2024-12-10 | End: 2024-12-10

## 2024-12-10 RX ORDER — METOPROLOL TARTRATE 100 MG/1
TABLET ORAL
Qty: 2 TABLET | Refills: 0 | Status: SHIPPED | OUTPATIENT
Start: 2024-12-10

## 2024-12-10 RX ORDER — SODIUM CHLORIDE 9 MG/ML
40 INJECTION, SOLUTION INTRAVENOUS AS NEEDED
OUTPATIENT
Start: 2024-12-10

## 2024-12-10 RX ORDER — METOPROLOL TARTRATE 25 MG/1
25 TABLET, FILM COATED ORAL ONCE
OUTPATIENT
Start: 2024-12-10

## 2024-12-10 RX ORDER — METOPROLOL TARTRATE 25 MG/1
100 TABLET, FILM COATED ORAL ONCE
OUTPATIENT
Start: 2024-12-10

## 2024-12-10 RX ORDER — SODIUM CHLORIDE 0.9 % (FLUSH) 0.9 %
10 SYRINGE (ML) INJECTION AS NEEDED
OUTPATIENT
Start: 2024-12-10

## 2024-12-10 RX ORDER — METOPROLOL TARTRATE 25 MG/1
150 TABLET, FILM COATED ORAL ONCE
OUTPATIENT
Start: 2024-12-10

## 2024-12-10 RX ORDER — METOPROLOL TARTRATE 1 MG/ML
5 INJECTION, SOLUTION INTRAVENOUS
OUTPATIENT
Start: 2024-12-10

## 2024-12-10 RX ORDER — METOPROLOL TARTRATE 25 MG/1
50 TABLET, FILM COATED ORAL ONCE
OUTPATIENT
Start: 2024-12-10

## 2024-12-10 NOTE — PROGRESS NOTES
Chief Complaint  Follow-up, Chest Pain, and Shortness of Breath    Subjective            Waleska RANDOLPH Truong presents to [facility] CARDIOLOGY  History of Present Illness    Ms. Truong is here for follow-up evaluation management of essential hypertension, chest pain, asthma.  She had a stress echo April of this year, this was a low risk study showing no evidence of ischemia.  Normal biventricular function, mild LVH.  She also had a 30-day event monitor recently which showed sinus mechanism with rare ectopy.  158 triggered events correlated to sinus rhythm or sinus tachycardia.    2 weeks ago she states that she began having more chest pain and shortness of breath than usual.  This led to her staying in bed for about a week.  She felt very dizzy with position changes.  She reports that she has iron deficiency anemia and she was off her medication for that.  She also reports she was not drinking much water.  She is feeling better this week however she continues to have pressure in her chest with exertion especially when she is walking up steps or walking an extended way.  She reports heart disease in her sister and mother and she is concerned about this for herself as well.  She reports weight gain and more sedentary status.    PMH  Past Medical History:   Diagnosis Date    Asthma     Hypertension          SURGICALHX  Past Surgical History:   Procedure Laterality Date     SECTION N/A 2008     SECTION N/A 2020    CHOLECYSTECTOMY N/A         SOC  Social History     Socioeconomic History    Marital status: Single   Tobacco Use    Smoking status: Never     Passive exposure: Never    Smokeless tobacco: Never   Vaping Use    Vaping status: Never Used   Substance and Sexual Activity    Alcohol use: Never    Drug use: Never    Sexual activity: Defer         FAMHX  Family History   Problem Relation Age of Onset    Hypertension Mother     Hypertension Father     Heart failure  Sister           ALLERGY  No Known Allergies     MEDSCURRENT    Current Outpatient Medications:     amLODIPine (NORVASC) 5 MG tablet, Take 1 tablet by mouth Daily., Disp: , Rfl:     aspirin 81 MG EC tablet, Take 1 tablet by mouth Daily., Disp: , Rfl:     budesonide-formoterol (SYMBICORT) 160-4.5 MCG/ACT inhaler, Inhale., Disp: , Rfl:     busPIRone (BUSPAR) 10 MG tablet, Take 1 tablet by mouth 3 (Three) Times a Day., Disp: , Rfl:     carvedilol (COREG) 6.25 MG tablet, Take 1 tablet by mouth 2 (Two) Times a Day With Meals., Disp: 180 tablet, Rfl: 5    cetirizine (zyrTEC) 10 MG tablet, Take  by mouth., Disp: , Rfl:     docusate sodium 100 MG capsule, Take 1 capsule by mouth Daily., Disp: , Rfl:     famotidine (PEPCID) 20 MG tablet, Take  by mouth., Disp: , Rfl:     ferrous sulfate 324 (65 Fe) MG tablet delayed-release EC tablet, , Disp: , Rfl:     fluticasone (FLONASE) 50 MCG/ACT nasal spray, Administer  into the nostril(s) as directed by provider., Disp: , Rfl:     montelukast (SINGULAIR) 10 MG tablet, Take  by mouth., Disp: , Rfl:     nitroglycerin (NITROSTAT) 0.4 MG SL tablet, Place 1 tablet under the tongue Every 5 (Five) Minutes As Needed., Disp: , Rfl:     nystatin (MYCOSTATIN) 606950 UNIT/GM powder, Apply 100,000 Applications topically to the appropriate area as directed 2 (Two) Times a Day., Disp: , Rfl:     omeprazole (priLOSEC) 20 MG capsule, Take 1 capsule by mouth Daily., Disp: , Rfl:     pantoprazole (PROTONIX) 40 MG EC tablet, Take 1 tablet by mouth Daily., Disp: , Rfl:     polyethylene glycol (MIRALAX) 17 GM/SCOOP powder, Take 0.4 g/kg by mouth Daily., Disp: , Rfl:     risperiDONE (risperDAL) 1 MG tablet, Take 1 tablet by mouth Daily., Disp: , Rfl:     sucralfate (CARAFATE) 1 g tablet, Take  by mouth., Disp: , Rfl:     Ventolin  (90 Base) MCG/ACT inhaler, Inhale 2 puffs Every 4 (Four) Hours As Needed., Disp: , Rfl:     metoprolol tartrate (LOPRESSOR) 100 MG tablet, Take 100 mg at Bedtime the Night  "Before Coronary CTA Appointment and In the Morning 1 Hour Prior to Coronary CTA Appointment. Do not take if heart rate less than 60., Disp: 2 tablet, Rfl: 0      Review of Systems   Constitutional: Positive for weight gain.   Cardiovascular:  Positive for chest pain and dyspnea on exertion. Negative for syncope.   Neurological:  Positive for dizziness.        Objective     /80   Pulse 84   Ht 160 cm (62.99\")   Wt 112 kg (247 lb)   BMI 43.77 kg/m²       General Appearance:   well developed  well nourished  HENT:   oropharynx moist  lips not cyanotic  Neck:  thyroid not enlarged  supple  Respiratory:  no respiratory distress  normal breath sounds  no rales  Cardiovascular:  no jugular venous distention  regular rhythm  apical impulse normal  S1 normal, S2 normal  no S3, no S4   no murmur  no rub, no thrill  carotid pulses normal; no bruit  pedal pulses normal  lower extremity edema: none    Musculoskeletal:  no clubbing of fingers.   normocephalic, head atraumatic  Skin:   warm, dry  Psychiatric:  judgement and insight appropriate  normal mood and affect      Result Review :     The following data was reviewed by: ROXI Renee on 12/10/2024:              Data reviewed : Cardiology studies cardiac testing reviewed above      Procedures      Waleska Truong  reports that she has never smoked. She has never been exposed to tobacco smoke. She has never used smokeless tobacco. I have educated her on the risk of diseases from using tobacco products such as cancer, COPD, and heart disease.              Assessment and Plan        ASSESSMENT:  Encounter Diagnoses   Name Primary?    Essential hypertension Yes    Palpitations     Precordial chest pain     Shortness of breath          PLAN:    1.  Precordial chest pain with associated shortness of breath on exertion, her stress echo did not show any evidence of ischemia, normal EF.  However due to her continued symptoms we will arrange for coronary " CTA for further ischemia evaluation.  She is agreeable to this plan.  2.  We did discuss that her weight gain could also be contributing to her shortness of breath.  She has been more sedentary recently.  If her CTA is normal, would recommend her implementing calorie monitoring to remain in a deficit as well as implementing a exercise program.  3.  Palpitations, stable.  30-day event monitor did not show arrhythmias.    Follow-up to be determined after testing.      Patient was given instructions and counseling regarding her condition or for health maintenance advice. Please see specific information pulled into the AVS if appropriate.           Maryam White, APRN   12/10/2024  15:07 EST

## 2024-12-11 ENCOUNTER — TELEPHONE (OUTPATIENT)
Dept: CARDIOLOGY | Facility: CLINIC | Age: 44
End: 2024-12-11
Payer: MEDICAID

## 2024-12-11 NOTE — TELEPHONE ENCOUNTER
Caller: Waleska Truong    Relationship: Self    Best call back number: [phone]     What is the best time to reach you: ANYTIME    Who are you requesting to speak with (clinical staff, provider,  specific staff member): CLINICAL STAFF    What was the call regarding: PATIENT HAS QUESTIONS ABOUT HER ECHO.

## 2025-01-20 ENCOUNTER — TELEPHONE (OUTPATIENT)
Dept: CARDIOLOGY | Facility: CLINIC | Age: 45
End: 2025-01-20

## 2025-01-20 NOTE — TELEPHONE ENCOUNTER
The [facility] received a fax that requires your attention. The document has been indexed to the patient’s chart for your review.      Reason for sending: EXTERNAL MEDICAL RECORD NOTIFICATION     Documents Description: TRANSPORT PAPERWORK-[facility] TRANSPORT-1.17.25    Name of Sender: [facility] TRANSPORTATION     Date Indexed: 1.17.25    NEEDING FILLED OUT

## 2025-01-21 ENCOUNTER — TELEPHONE (OUTPATIENT)
Dept: CARDIOLOGY | Facility: CLINIC | Age: 45
End: 2025-01-21
Payer: MEDICAID

## 2025-01-21 NOTE — TELEPHONE ENCOUNTER
Caller: Waleska Truong    Relationship: Self    Best call back number: [phone]    What is the best time to reach you: ANYTIME    Who are you requesting to speak with (clinical staff, provider,  specific staff member): NURSE    What was the call regarding: PT CALLED IN STATING SHE HAD MILD CHEST PAINS/ DISCOMFORT AND HER BP WAS RUNNING 114/76. SHE WAS REQUESTING TO SPEAK WITH A NURSE. ATTEMPTED TO WT TO CLINICAL LINE AND WAS TOLD TO RELAY PT TO GO TO THE ER. PT STATED SHE DIDN'T BELIEVE IT WAS BAD ENOUGH TO GO TO THE ER AND REALLY JUST HAD QUESTIONS ABOUT HER BP. PLEASE CALL PT BACK TO DISCUSS ASAP.

## 2025-01-23 NOTE — TELEPHONE ENCOUNTER
PARKER patient. Patient reports chest pain that comes and goes since Sunday. Describes pain as dull and heavy. Reports it feels like a pulled muscle and has not done any activity to cause this. /76 yesterday has not checked since. States shortness of breath at times but reports bad asthma. Compliant with carvedilol 6.25 mg daily and amlodipine 5 mg daily. Patient has CT angiogram coronary scheduled on 02/04/2025. Advised to go to ER for chest pain.    Patient reports went to PCP yesterday and they want to stop her carvedilol 6.25 mg daily due to interaction with albuterol. Patient wanted cardiologist to ok before stopping.

## 2025-01-24 NOTE — TELEPHONE ENCOUNTER
PARKER patient regarding recommendations. Voiced understanding. Patient reports she is going to continue carvedilol at this time.

## 2025-01-24 NOTE — TELEPHONE ENCOUNTER
Carvedilol could potentially reduce the bronchodilator effect of albuterol.  However, would closely monitor blood pressure while titrating off of carvedilol if that is what PCP is recommending.      Also, if they are used together would just monitor closely to make sure she is still getting good bronchodilating effects from the albuterol.  They can be used together just with close monitoring.

## 2025-02-03 NOTE — PROGRESS NOTES
02/04/25 0001   Pre-Procedure Phone Call   Procedure Time Verified Yes   Arrival Time 1300   Procedure Location Verified Yes   Medical History Reviewed No   NPO Status Reinforced Yes   Ride and Caregiver Arranged N/A   Patient Knows to Bring Current Medications   (Pre meds ordered.)

## 2025-02-04 ENCOUNTER — HOSPITAL ENCOUNTER (OUTPATIENT)
Dept: CT IMAGING | Facility: HOSPITAL | Age: 45
Discharge: HOME | End: 2025-02-04
Payer: MEDICARE

## 2025-02-04 ENCOUNTER — HOSPITAL ENCOUNTER (OUTPATIENT)
Dept: CT IMAGING | Facility: HOSPITAL | Age: 45
Discharge: HOME | End: 2025-02-04
Payer: MEDICAID

## 2025-02-04 VITALS — RESPIRATION RATE: 16 BRPM | DIASTOLIC BLOOD PRESSURE: 68 MMHG | HEART RATE: 67 BPM | SYSTOLIC BLOOD PRESSURE: 111 MMHG

## 2025-02-04 DIAGNOSIS — R07.2 PRECORDIAL CHEST PAIN: ICD-10-CM

## 2025-02-04 PROCEDURE — 75574 CT ANGIO HRT W/3D IMAGE: CPT

## 2025-02-04 PROCEDURE — 25510000001 IOPAMIDOL PER 1 ML: Performed by: NURSE PRACTITIONER

## 2025-02-04 RX ORDER — SODIUM CHLORIDE 9 MG/ML
40 INJECTION, SOLUTION INTRAVENOUS AS NEEDED
Status: DISCONTINUED | OUTPATIENT
Start: 2025-02-04 | End: 2025-02-05 | Stop reason: HOSPADM

## 2025-02-04 RX ORDER — IOPAMIDOL 755 MG/ML
100 INJECTION, SOLUTION INTRAVASCULAR
Status: COMPLETED | OUTPATIENT
Start: 2025-02-04 | End: 2025-02-04

## 2025-02-04 RX ORDER — NITROGLYCERIN 0.4 MG/1
0.8 TABLET SUBLINGUAL
Status: COMPLETED | OUTPATIENT
Start: 2025-02-04 | End: 2025-02-04

## 2025-02-04 RX ORDER — NITROGLYCERIN 0.4 MG/1
0.4 TABLET SUBLINGUAL
Status: COMPLETED | OUTPATIENT
Start: 2025-02-04 | End: 2025-02-04

## 2025-02-04 RX ORDER — SODIUM CHLORIDE 0.9 % (FLUSH) 0.9 %
10 SYRINGE (ML) INJECTION EVERY 12 HOURS SCHEDULED
Status: DISCONTINUED | OUTPATIENT
Start: 2025-02-04 | End: 2025-02-05 | Stop reason: HOSPADM

## 2025-02-04 RX ORDER — SODIUM CHLORIDE 0.9 % (FLUSH) 0.9 %
10 SYRINGE (ML) INJECTION AS NEEDED
Status: DISCONTINUED | OUTPATIENT
Start: 2025-02-04 | End: 2025-02-05 | Stop reason: HOSPADM

## 2025-02-04 RX ADMIN — NITROGLYCERIN 0.8 MG: 0.4 TABLET SUBLINGUAL at 14:47

## 2025-02-04 RX ADMIN — IOPAMIDOL 95 ML: 755 INJECTION, SOLUTION INTRAVENOUS at 14:47

## 2025-02-05 ENCOUNTER — PATIENT MESSAGE (OUTPATIENT)
Age: 45
End: 2025-02-05
Payer: MEDICARE

## 2025-02-05 ENCOUNTER — TELEPHONE (OUTPATIENT)
Dept: CARDIOLOGY | Facility: CLINIC | Age: 45
End: 2025-02-05
Payer: MEDICARE

## 2025-02-05 NOTE — TELEPHONE ENCOUNTER
Caller: Waleska Truong    Relationship: Self    Best call back number: [phone] (home)      Caller requesting test results: PATIENT     What test was performed: CT ANGIOGRAM      When was the test performed: 2/4/25    Where was the test performed: [facility]     Additional notes: PLEASE CALL WITH RESULTS

## 2025-02-05 NOTE — TELEPHONE ENCOUNTER
CTA was normal showing no cardiac abnormalities.  Total calcium score was 0 meaning there is no identifiable plaque in the coronary arteries.    These are reassuring results and would touch base with PCP to consider noncardiac causes of chest pain.

## 2025-02-14 ENCOUNTER — TELEPHONE (OUTPATIENT)
Dept: CARDIOLOGY | Facility: CLINIC | Age: 45
End: 2025-02-14
Payer: MEDICARE

## 2025-02-14 NOTE — TELEPHONE ENCOUNTER
Caller: Waleska Truong    Relationship: Self    Best call back number: [phone]    What was the call regarding: PT RETURNING A CALL TO JANET TO GO OVER TEST RESULTS AND ONGOING QUESTIONS. WT TO OFFICE.

## 2025-02-14 NOTE — TELEPHONE ENCOUNTER
PT CALLED BACK TO SPEAK WITH JANET. ADVISED PT IT MAY TAKE 48 HOURS FOR THE OFFICE TO RESPOND TO THIS ENCOUNTER. ADVISED IT MAY BE MONDAY BEFORE PATIENT HEARS SOMETHING BACK.

## 2025-04-10 ENCOUNTER — TELEPHONE (OUTPATIENT)
Dept: CARDIOLOGY | Facility: CLINIC | Age: 45
End: 2025-04-10
Payer: MEDICARE

## 2025-04-10 NOTE — TELEPHONE ENCOUNTER
Caller: Waleska Truong    Relationship to patient: Self    Best call back number: [phone]     Chief complaint: CHEST PAIN/ DIZZINESS     Type of visit: FOLLOW UP     Requested date: AS ADVISED      Additional notes:NO SYMPTOMS AT TIME OF CALL

## 2025-04-10 NOTE — TELEPHONE ENCOUNTER
ATTEMPTED TO CALL PATIENT. CALL WENT STRAIGHT TO VOICEMAIL, BUT VOICEMAIL HAS NOT BEEN SET UP YET.

## 2025-04-14 ENCOUNTER — TELEPHONE (OUTPATIENT)
Dept: CARDIOLOGY | Facility: CLINIC | Age: 45
End: 2025-04-14

## 2025-04-14 NOTE — TELEPHONE ENCOUNTER
Hub staff attempted to follow warm transfer process and was unsuccessful     Caller: Waleska Truong    Relationship to patient: Self    Best call back number: [phone] (home)     Patient is needing: PATIENT RETURNED MARICEL'S CALL.

## 2025-05-01 ENCOUNTER — TELEPHONE (OUTPATIENT)
Dept: CARDIOLOGY | Facility: CLINIC | Age: 45
End: 2025-05-01

## 2025-05-01 NOTE — TELEPHONE ENCOUNTER
Caller: Waleska Truong    Relationship to patient: Self    Best call back number:   [phone]    Chief complaint: FOLLOW UP FOR SOA AND CP. WENT TO [facility] EMERGENCY ON 4/28/25     Type of visit: FOLLOW UP    Requested date: 5-7-25    If rescheduling, when is the original appointment: 5-6-25     Additional notes:NEXT AVAILABLE IS 6-3-25

## 2025-07-01 ENCOUNTER — OFFICE VISIT (OUTPATIENT)
Dept: CARDIOLOGY | Facility: CLINIC | Age: 45
End: 2025-07-01
Payer: MEDICAID

## 2025-07-01 VITALS
DIASTOLIC BLOOD PRESSURE: 84 MMHG | WEIGHT: 243.2 LBS | BODY MASS INDEX: 43.09 KG/M2 | OXYGEN SATURATION: 100 % | SYSTOLIC BLOOD PRESSURE: 130 MMHG | HEART RATE: 78 BPM | HEIGHT: 63 IN

## 2025-07-01 DIAGNOSIS — I10 ESSENTIAL HYPERTENSION: ICD-10-CM

## 2025-07-01 DIAGNOSIS — R06.09 DYSPNEA ON EXERTION: ICD-10-CM

## 2025-07-01 DIAGNOSIS — F41.9 ANXIETY: ICD-10-CM

## 2025-07-01 DIAGNOSIS — R00.2 PALPITATIONS: ICD-10-CM

## 2025-07-01 DIAGNOSIS — R07.2 PRECORDIAL CHEST PAIN: Primary | ICD-10-CM

## 2025-07-01 PROCEDURE — 1160F RVW MEDS BY RX/DR IN RCRD: CPT | Performed by: NURSE PRACTITIONER

## 2025-07-01 PROCEDURE — 99214 OFFICE O/P EST MOD 30 MIN: CPT | Performed by: NURSE PRACTITIONER

## 2025-07-01 PROCEDURE — 3075F SYST BP GE 130 - 139MM HG: CPT | Performed by: NURSE PRACTITIONER

## 2025-07-01 PROCEDURE — 3079F DIAST BP 80-89 MM HG: CPT | Performed by: NURSE PRACTITIONER

## 2025-07-01 PROCEDURE — 1159F MED LIST DOCD IN RCRD: CPT | Performed by: NURSE PRACTITIONER

## 2025-07-01 RX ORDER — NITROGLYCERIN 0.4 MG/1
0.4 TABLET SUBLINGUAL
Qty: 30 TABLET | Refills: 3 | Status: SHIPPED | OUTPATIENT
Start: 2025-07-01

## 2025-07-01 NOTE — PROGRESS NOTES
Chief Complaint  Hospital Follow Up Visit, Shortness of Breath, Chest Pain, Fatigue, and Dizziness    Subjective            Waleska RANDOLPH Truong presents to [facility] CARDIOLOGY  History of Present Illness    Ms. Truong is here for follow-up.  She has a history of essential hypertension.  She is here for early follow-up.  She was evaluated recently at [facility] ER due to chest pain, shortness of breath, dizziness.  Workup there was unremarkable.  Her cardiac markers were negative.  EKG showed sinus rhythm with a heart rate of 88.  Negative chest x-ray.  Negative CT of the head.    She reports a multitude of symptoms.  A week ago while picking up her child, she felt a chest pain.  Since then she has felt constant chest pain, no energy, dizziness, and shortness of breath.  She reports the chest pain migrates all over her chest and is worse with palpation.  But is there all the time.  She reports a lot of stress at home lately as well as grief.  She continues to have palpitations.    Recent cardiac workup included a stress echo which showed no evidence of ischemic or structural heart disease.  30-day event monitor without any sustained arrhythmias, rare ectopy.  Coronary CTA that was unremarkable.  Calcium score of 0.    PMH  Past Medical History:   Diagnosis Date    Asthma     Hypertension          SURGICALHX  Past Surgical History:   Procedure Laterality Date     SECTION N/A 2008     SECTION N/A 2020    CHOLECYSTECTOMY N/A         SOC  Social History     Socioeconomic History    Marital status: Single   Tobacco Use    Smoking status: Never     Passive exposure: Never    Smokeless tobacco: Never   Vaping Use    Vaping status: Never Used   Substance and Sexual Activity    Alcohol use: Never    Drug use: Never    Sexual activity: Defer         FAMHX  Family History   Problem Relation Age of Onset    Hypertension Mother     Hypertension Father     Heart failure Sister            ALLERGY  No Known Allergies     MEDSCURRENT    Current Outpatient Medications:     amLODIPine (NORVASC) 5 MG tablet, Take 1 tablet by mouth Daily., Disp: , Rfl:     aspirin 81 MG EC tablet, Take 1 tablet by mouth Daily., Disp: , Rfl:     budesonide-formoterol (SYMBICORT) 160-4.5 MCG/ACT inhaler, Inhale., Disp: , Rfl:     busPIRone (BUSPAR) 10 MG tablet, Take 1 tablet by mouth 3 (Three) Times a Day., Disp: , Rfl:     carvedilol (COREG) 6.25 MG tablet, Take 1 tablet by mouth 2 (Two) Times a Day With Meals., Disp: 180 tablet, Rfl: 5    cetirizine (zyrTEC) 10 MG tablet, Take  by mouth., Disp: , Rfl:     docusate sodium 100 MG capsule, Take 1 capsule by mouth Daily., Disp: , Rfl:     famotidine (PEPCID) 20 MG tablet, Take  by mouth., Disp: , Rfl:     ferrous sulfate 324 (65 Fe) MG tablet delayed-release EC tablet, , Disp: , Rfl:     fluticasone (FLONASE) 50 MCG/ACT nasal spray, Administer  into the nostril(s) as directed by provider., Disp: , Rfl:     metoprolol tartrate (LOPRESSOR) 100 MG tablet, Take 100 mg at Bedtime the Night Before Coronary CTA Appointment and In the Morning 1 Hour Prior to Coronary CTA Appointment. Do not take if heart rate less than 60., Disp: 2 tablet, Rfl: 0    montelukast (SINGULAIR) 10 MG tablet, Take  by mouth., Disp: , Rfl:     nitroglycerin (NITROSTAT) 0.4 MG SL tablet, Place 1 tablet under the tongue Every 5 (Five) Minutes As Needed for Chest Pain., Disp: 30 tablet, Rfl: 3    nystatin (MYCOSTATIN) 511952 UNIT/GM powder, Apply 100,000 Applications topically to the appropriate area as directed 2 (Two) Times a Day., Disp: , Rfl:     omeprazole (priLOSEC) 20 MG capsule, Take 1 capsule by mouth Daily., Disp: , Rfl:     pantoprazole (PROTONIX) 40 MG EC tablet, Take 1 tablet by mouth Daily., Disp: , Rfl:     polyethylene glycol (MIRALAX) 17 GM/SCOOP powder, Take 0.4 g/kg by mouth Daily., Disp: , Rfl:     risperiDONE (risperDAL) 1 MG tablet, Take 1 tablet by mouth Daily., Disp: , Rfl:      "sucralfate (CARAFATE) 1 g tablet, Take  by mouth., Disp: , Rfl:     Ventolin  (90 Base) MCG/ACT inhaler, Inhale 2 puffs Every 4 (Four) Hours As Needed., Disp: , Rfl:       Review of Systems   Constitutional: Positive for malaise/fatigue.   Cardiovascular:  Positive for chest pain, dyspnea on exertion and palpitations.        Objective     /84   Pulse 78   Ht 160 cm (62.99\")   Wt 110 kg (243 lb 3.2 oz)   SpO2 100%   BMI 43.09 kg/m²       General Appearance:   well developed  well nourished  HENT:   oropharynx moist  lips not cyanotic  Neck:  thyroid not enlarged  supple  Respiratory:  no respiratory distress  normal breath sounds  no rales  Cardiovascular:  no jugular venous distention  regular rhythm  apical impulse normal  S1 normal, S2 normal  no S3, no S4   no murmur  no rub, no thrill  carotid pulses normal; no bruit  pedal pulses normal  lower extremity edema: none    Musculoskeletal:  no clubbing of fingers.   normocephalic, head atraumatic  Skin:   warm, dry  Psychiatric:  judgement and insight appropriate  normal mood and affect      Result Review :     The following data was reviewed by: ROXI Renee on 07/01/2025:              Data reviewed : Cardiology studies cardiac testing reviewed above.  ER records reviewed     Procedures      Waleska Truong  reports that she has never smoked. She has never been exposed to tobacco smoke. She has never used smokeless tobacco. I have educated her on the risk of diseases from using tobacco products such as cancer, COPD, and heart disease.              Assessment and Plan        ASSESSMENT:  Encounter Diagnoses   Name Primary?    Precordial chest pain Yes    Essential hypertension     Palpitations     Dyspnea on exertion     Anxiety          PLAN:    1.  Precordial chest pain, shortness of breath-all cardiac workup has been normal thus far.  She has had a relatively extensive workup.  She continues to have shortness of breath, " recommended pulmonary function testing.  Will also repeat echocardiogram.  She reports shortness of breath and swelling.  I do not think we need to repeat ischemic testing at this time as stress test was normal and coronary CTA showed calcium score of 0.  2.  Essential hypertension controlled, continue current medical therapy.  3.  Palpitations, normal 30-day event monitor.  4.  If testing comes back normal, recommended patient touch base with primary care provider for further evaluation for her symptoms.    Patient is agreeable to this plan.      Patient was given instructions and counseling regarding her condition or for health maintenance advice. Please see specific information pulled into the AVS if appropriate.           Maryam White, APRN   7/1/2025  09:07 EDT

## 2025-07-18 ENCOUNTER — TELEPHONE (OUTPATIENT)
Dept: CARDIOLOGY | Age: 45
End: 2025-07-18
Payer: MEDICAID

## 2025-07-21 ENCOUNTER — HOSPITAL ENCOUNTER (OUTPATIENT)
Dept: CARDIOLOGY | Facility: HOSPITAL | Age: 45
Discharge: HOME | End: 2025-07-21
Admitting: NURSE PRACTITIONER
Payer: MEDICAID

## 2025-07-21 VITALS
WEIGHT: 243 LBS | BODY MASS INDEX: 44.72 KG/M2 | OXYGEN SATURATION: 100 % | DIASTOLIC BLOOD PRESSURE: 82 MMHG | HEIGHT: 62 IN | SYSTOLIC BLOOD PRESSURE: 124 MMHG | HEART RATE: 78 BPM

## 2025-07-21 DIAGNOSIS — R07.2 PRECORDIAL CHEST PAIN: ICD-10-CM

## 2025-07-21 DIAGNOSIS — R06.09 DYSPNEA ON EXERTION: ICD-10-CM

## 2025-07-21 LAB
AORTIC ARCH: 2.9 CM
AORTIC DIMENSIONLESS INDEX: 0.66 (DI)
ASCENDING AORTA: 2.3 CM
AV MEAN PRESS GRAD SYS DOP V1V2: 5 MMHG
AV VMAX SYS DOP: 159 CM/SEC
BH CV ECHO LEFT VENTRICLE GLOBAL LONGITUDINAL STRAIN: -21.7 %
BH CV ECHO MEAS - ACS: 1.77 CM
BH CV ECHO MEAS - AO MAX PG: 10.1 MMHG
BH CV ECHO MEAS - AO ROOT AREA (BSA CORRECTED): 1.3 CM2
BH CV ECHO MEAS - AO ROOT DIAM: 2.7 CM
BH CV ECHO MEAS - AO V2 VTI: 32.5 CM
BH CV ECHO MEAS - AVA(I,D): 1.71 CM2
BH CV ECHO MEAS - EDV(CUBED): 59.3 ML
BH CV ECHO MEAS - EDV(MOD-SP2): 64 ML
BH CV ECHO MEAS - EDV(MOD-SP4): 68 ML
BH CV ECHO MEAS - EF(MOD-SP2): 67.2 %
BH CV ECHO MEAS - EF(MOD-SP4): 70.6 %
BH CV ECHO MEAS - ESV(CUBED): 13.6 ML
BH CV ECHO MEAS - ESV(MOD-SP2): 21 ML
BH CV ECHO MEAS - ESV(MOD-SP4): 20 ML
BH CV ECHO MEAS - FS: 38.8 %
BH CV ECHO MEAS - IVS/LVPW: 0.91 CM
BH CV ECHO MEAS - IVSD: 1 CM
BH CV ECHO MEAS - LAT PEAK E' VEL: 9.4 CM/SEC
BH CV ECHO MEAS - LV DIASTOLIC VOL/BSA (35-75): 32.7 CM2
BH CV ECHO MEAS - LV MASS(C)D: 131 GRAMS
BH CV ECHO MEAS - LV MAX PG: 4.8 MMHG
BH CV ECHO MEAS - LV MEAN PG: 2 MMHG
BH CV ECHO MEAS - LV SYSTOLIC VOL/BSA (12-30): 9.6 CM2
BH CV ECHO MEAS - LV V1 MAX: 109 CM/SEC
BH CV ECHO MEAS - LV V1 VTI: 21.6 CM
BH CV ECHO MEAS - LVIDD: 3.9 CM
BH CV ECHO MEAS - LVIDS: 2.39 CM
BH CV ECHO MEAS - LVOT AREA: 2.6 CM2
BH CV ECHO MEAS - LVOT DIAM: 1.81 CM
BH CV ECHO MEAS - LVPWD: 1.1 CM
BH CV ECHO MEAS - MED PEAK E' VEL: 8.4 CM/SEC
BH CV ECHO MEAS - MV A DUR: 0.11 SEC
BH CV ECHO MEAS - MV A MAX VEL: 67.3 CM/SEC
BH CV ECHO MEAS - MV DEC SLOPE: 493.2 CM/SEC2
BH CV ECHO MEAS - MV DEC TIME: 0.21 SEC
BH CV ECHO MEAS - MV E MAX VEL: 82 CM/SEC
BH CV ECHO MEAS - MV E/A: 1.22
BH CV ECHO MEAS - MV MAX PG: 3.8 MMHG
BH CV ECHO MEAS - MV MEAN PG: 2.05 MMHG
BH CV ECHO MEAS - MV P1/2T: 58 MSEC
BH CV ECHO MEAS - MV V2 VTI: 29.7 CM
BH CV ECHO MEAS - MVA(P1/2T): 3.8 CM2
BH CV ECHO MEAS - MVA(VTI): 1.87 CM2
BH CV ECHO MEAS - PA ACC TIME: 0.11 SEC
BH CV ECHO MEAS - PA V2 MAX: 85.5 CM/SEC
BH CV ECHO MEAS - PULM A REVS DUR: 0.1 SEC
BH CV ECHO MEAS - PULM A REVS VEL: 30.9 CM/SEC
BH CV ECHO MEAS - PULM DIAS VEL: 45.7 CM/SEC
BH CV ECHO MEAS - PULM S/D: 0.96
BH CV ECHO MEAS - PULM SYS VEL: 43.8 CM/SEC
BH CV ECHO MEAS - QP/QS: 0.67
BH CV ECHO MEAS - RAP SYSTOLE: 3 MMHG
BH CV ECHO MEAS - RV MAX PG: 1.98 MMHG
BH CV ECHO MEAS - RV V1 MAX: 70.3 CM/SEC
BH CV ECHO MEAS - RV V1 VTI: 15.7 CM
BH CV ECHO MEAS - RVOT DIAM: 1.74 CM
BH CV ECHO MEAS - SUP REN AO DIAM: 2 CM
BH CV ECHO MEAS - SV(LVOT): 55.6 ML
BH CV ECHO MEAS - SV(MOD-SP2): 43 ML
BH CV ECHO MEAS - SV(MOD-SP4): 48 ML
BH CV ECHO MEAS - SV(RVOT): 37.5 ML
BH CV ECHO MEAS - SVI(LVOT): 26.8 ML/M2
BH CV ECHO MEAS - SVI(MOD-SP2): 20.7 ML/M2
BH CV ECHO MEAS - SVI(MOD-SP4): 23.1 ML/M2
BH CV ECHO MEAS - TAPSE (>1.6): 1.7 CM
BH CV ECHO MEASUREMENTS AVERAGE E/E' RATIO: 9.21
BH CV XLRA - RV BASE: 3.4 CM
BH CV XLRA - RV LENGTH: 8.1 CM
BH CV XLRA - RV MID: 2.9 CM
BH CV XLRA - TDI S': 12 CM/SEC
LEFT ATRIUM VOLUME INDEX: 11.9 ML/M2
LV EF BIPLANE MOD: 68.8 %
SINUS: 2.6 CM
STJ: 2.3 CM

## 2025-07-21 PROCEDURE — 93306 TTE W/DOPPLER COMPLETE: CPT | Performed by: INTERNAL MEDICINE

## 2025-07-21 PROCEDURE — 93356 MYOCRD STRAIN IMG SPCKL TRCK: CPT

## 2025-07-21 PROCEDURE — 93356 MYOCRD STRAIN IMG SPCKL TRCK: CPT | Performed by: INTERNAL MEDICINE

## 2025-07-21 PROCEDURE — 93306 TTE W/DOPPLER COMPLETE: CPT

## 2025-07-22 ENCOUNTER — TELEPHONE (OUTPATIENT)
Dept: CARDIOLOGY | Facility: CLINIC | Age: 45
End: 2025-07-22

## 2025-08-12 ENCOUNTER — OFFICE VISIT (OUTPATIENT)
Dept: GASTROENTEROLOGY | Facility: CLINIC | Age: 45
End: 2025-08-12
Payer: MEDICAID

## 2025-08-12 ENCOUNTER — LAB (OUTPATIENT)
Dept: LAB | Facility: HOSPITAL | Age: 45
End: 2025-08-12
Payer: MEDICAID

## 2025-08-12 ENCOUNTER — HOSPITAL ENCOUNTER (OUTPATIENT)
Dept: GENERAL RADIOLOGY | Facility: HOSPITAL | Age: 45
Discharge: HOME | End: 2025-08-12
Payer: MEDICAID

## 2025-08-12 VITALS
TEMPERATURE: 97.8 F | WEIGHT: 247.4 LBS | DIASTOLIC BLOOD PRESSURE: 94 MMHG | HEART RATE: 75 BPM | HEIGHT: 63 IN | SYSTOLIC BLOOD PRESSURE: 128 MMHG | BODY MASS INDEX: 43.84 KG/M2 | OXYGEN SATURATION: 100 %

## 2025-08-12 DIAGNOSIS — K21.9 GASTROESOPHAGEAL REFLUX DISEASE, UNSPECIFIED WHETHER ESOPHAGITIS PRESENT: Primary | ICD-10-CM

## 2025-08-12 DIAGNOSIS — D50.9 IRON DEFICIENCY ANEMIA, UNSPECIFIED IRON DEFICIENCY ANEMIA TYPE: Primary | ICD-10-CM

## 2025-08-12 DIAGNOSIS — D50.9 IRON DEFICIENCY ANEMIA, UNSPECIFIED IRON DEFICIENCY ANEMIA TYPE: ICD-10-CM

## 2025-08-12 DIAGNOSIS — K59.00 CONSTIPATION, UNSPECIFIED CONSTIPATION TYPE: ICD-10-CM

## 2025-08-12 DIAGNOSIS — R10.30 LOWER ABDOMINAL PAIN: ICD-10-CM

## 2025-08-12 LAB
ALBUMIN SERPL-MCNC: 3.8 G/DL (ref 3.5–5.2)
ALBUMIN/GLOB SERPL: 1.1 G/DL
ALP SERPL-CCNC: 81 U/L (ref 39–117)
ALT SERPL W P-5'-P-CCNC: 14 U/L (ref 1–33)
ANION GAP SERPL CALCULATED.3IONS-SCNC: 9.3 MMOL/L (ref 5–15)
AST SERPL-CCNC: 12 U/L (ref 1–32)
BASOPHILS # BLD AUTO: 0.03 10*3/MM3 (ref 0–0.2)
BASOPHILS NFR BLD AUTO: 0.6 % (ref 0–1.5)
BILIRUB SERPL-MCNC: <0.2 MG/DL (ref 0–1.2)
BUN SERPL-MCNC: 11 MG/DL (ref 6–20)
BUN/CREAT SERPL: 14.5 (ref 7–25)
CALCIUM SPEC-SCNC: 9.2 MG/DL (ref 8.6–10.5)
CHLORIDE SERPL-SCNC: 107 MMOL/L (ref 98–107)
CO2 SERPL-SCNC: 24.7 MMOL/L (ref 22–29)
CREAT SERPL-MCNC: 0.76 MG/DL (ref 0.57–1)
DEPRECATED RDW RBC AUTO: 44.5 FL (ref 37–54)
EGFRCR SERPLBLD CKD-EPI 2021: 99.2 ML/MIN/1.73
EOSINOPHIL # BLD AUTO: 0.08 10*3/MM3 (ref 0–0.4)
EOSINOPHIL NFR BLD AUTO: 1.6 % (ref 0.3–6.2)
ERYTHROCYTE [DISTWIDTH] IN BLOOD BY AUTOMATED COUNT: 15.3 % (ref 12.3–15.4)
FERRITIN SERPL-MCNC: 28.1 NG/ML (ref 13–150)
GLOBULIN UR ELPH-MCNC: 3.4 GM/DL
GLUCOSE SERPL-MCNC: 108 MG/DL (ref 65–99)
HCT VFR BLD AUTO: 35.4 % (ref 34–46.6)
HGB BLD-MCNC: 11.5 G/DL (ref 12–15.9)
IMM GRANULOCYTES # BLD AUTO: 0.01 10*3/MM3 (ref 0–0.05)
IMM GRANULOCYTES NFR BLD AUTO: 0.2 % (ref 0–0.5)
IRON 24H UR-MRATE: 40 MCG/DL (ref 37–145)
IRON SATN MFR SERPL: 10 % (ref 20–50)
LYMPHOCYTES # BLD AUTO: 1.61 10*3/MM3 (ref 0.7–3.1)
LYMPHOCYTES NFR BLD AUTO: 32.8 % (ref 19.6–45.3)
MCH RBC QN AUTO: 26 PG (ref 26.6–33)
MCHC RBC AUTO-ENTMCNC: 32.5 G/DL (ref 31.5–35.7)
MCV RBC AUTO: 80.1 FL (ref 79–97)
MONOCYTES # BLD AUTO: 0.45 10*3/MM3 (ref 0.1–0.9)
MONOCYTES NFR BLD AUTO: 9.2 % (ref 5–12)
NEUTROPHILS NFR BLD AUTO: 2.73 10*3/MM3 (ref 1.7–7)
NEUTROPHILS NFR BLD AUTO: 55.6 % (ref 42.7–76)
NRBC BLD AUTO-RTO: 0 /100 WBC (ref 0–0.2)
PLATELET # BLD AUTO: 277 10*3/MM3 (ref 140–450)
PMV BLD AUTO: 9.8 FL (ref 6–12)
POTASSIUM SERPL-SCNC: 3.5 MMOL/L (ref 3.5–5.2)
PROT SERPL-MCNC: 7.2 G/DL (ref 6–8.5)
RBC # BLD AUTO: 4.42 10*6/MM3 (ref 3.77–5.28)
SODIUM SERPL-SCNC: 141 MMOL/L (ref 136–145)
TIBC SERPL-MCNC: 398 MCG/DL (ref 298–536)
TRANSFERRIN SERPL-MCNC: 267 MG/DL (ref 200–360)
TSH SERPL DL<=0.05 MIU/L-ACNC: 2.75 UIU/ML (ref 0.27–4.2)
WBC NRBC COR # BLD AUTO: 4.91 10*3/MM3 (ref 3.4–10.8)

## 2025-08-12 PROCEDURE — 99214 OFFICE O/P EST MOD 30 MIN: CPT | Performed by: NURSE PRACTITIONER

## 2025-08-12 PROCEDURE — 84466 ASSAY OF TRANSFERRIN: CPT

## 2025-08-12 PROCEDURE — 3074F SYST BP LT 130 MM HG: CPT | Performed by: NURSE PRACTITIONER

## 2025-08-12 PROCEDURE — 82728 ASSAY OF FERRITIN: CPT

## 2025-08-12 PROCEDURE — 85025 COMPLETE CBC W/AUTO DIFF WBC: CPT | Performed by: NURSE PRACTITIONER

## 2025-08-12 PROCEDURE — 80053 COMPREHEN METABOLIC PANEL: CPT | Performed by: NURSE PRACTITIONER

## 2025-08-12 PROCEDURE — 3080F DIAST BP >= 90 MM HG: CPT | Performed by: NURSE PRACTITIONER

## 2025-08-12 PROCEDURE — 36415 COLL VENOUS BLD VENIPUNCTURE: CPT | Performed by: NURSE PRACTITIONER

## 2025-08-12 PROCEDURE — 83540 ASSAY OF IRON: CPT

## 2025-08-12 PROCEDURE — 74018 RADEX ABDOMEN 1 VIEW: CPT

## 2025-08-12 PROCEDURE — 84443 ASSAY THYROID STIM HORMONE: CPT | Performed by: NURSE PRACTITIONER

## 2025-08-12 RX ORDER — QUETIAPINE FUMARATE 25 MG/1
TABLET, FILM COATED ORAL EVERY 24 HOURS
COMMUNITY
Start: 2025-03-20

## 2025-08-12 RX ORDER — FLUTICASONE FUROATE, UMECLIDINIUM BROMIDE AND VILANTEROL TRIFENATATE 200; 62.5; 25 UG/1; UG/1; UG/1
POWDER RESPIRATORY (INHALATION)
COMMUNITY
Start: 2025-05-20

## 2025-08-12 RX ORDER — FUROSEMIDE 20 MG/1
TABLET ORAL
COMMUNITY
Start: 2025-05-20

## 2025-08-12 RX ORDER — POLYETHYLENE GLYCOL 3350 17 G/17G
POWDER, FOR SOLUTION ORAL
Qty: 30 PACKET | Refills: 3 | Status: SHIPPED | OUTPATIENT
Start: 2025-08-12

## 2025-08-13 ENCOUNTER — PREP FOR SURGERY (OUTPATIENT)
Dept: SURGERY | Facility: SURGERY CENTER | Age: 45
End: 2025-08-13
Payer: MEDICAID

## 2025-08-13 DIAGNOSIS — D50.9 IRON DEFICIENCY ANEMIA, UNSPECIFIED IRON DEFICIENCY ANEMIA TYPE: Primary | ICD-10-CM

## 2025-08-13 DIAGNOSIS — R10.30 LOWER ABDOMINAL PAIN: ICD-10-CM

## 2025-08-13 DIAGNOSIS — K21.9 GASTROESOPHAGEAL REFLUX DISEASE, UNSPECIFIED WHETHER ESOPHAGITIS PRESENT: ICD-10-CM

## 2025-08-13 RX ORDER — SODIUM CHLORIDE 0.9 % (FLUSH) 0.9 %
10 SYRINGE (ML) INJECTION AS NEEDED
OUTPATIENT
Start: 2025-08-13

## 2025-08-13 RX ORDER — SODIUM CHLORIDE 0.9 % (FLUSH) 0.9 %
3 SYRINGE (ML) INJECTION EVERY 12 HOURS SCHEDULED
OUTPATIENT
Start: 2025-08-13

## 2025-08-13 RX ORDER — SODIUM CHLORIDE, SODIUM LACTATE, POTASSIUM CHLORIDE, CALCIUM CHLORIDE 600; 310; 30; 20 MG/100ML; MG/100ML; MG/100ML; MG/100ML
30 INJECTION, SOLUTION INTRAVENOUS ONCE
OUTPATIENT
Start: 2025-08-13